# Patient Record
Sex: FEMALE | Race: WHITE | NOT HISPANIC OR LATINO | URBAN - METROPOLITAN AREA
[De-identification: names, ages, dates, MRNs, and addresses within clinical notes are randomized per-mention and may not be internally consistent; named-entity substitution may affect disease eponyms.]

---

## 2019-04-23 NOTE — PATIENT DISCUSSION
PHOTOGRAPHS: I have reviewed the external ocular photographs of this patient which show the following: lesion on the right lower eyelid.

## 2019-04-23 NOTE — PATIENT DISCUSSION
Patient presents w/ a small lesion on the right lower eyelid. Discussed r/b of excision vs continued observation with the  patient today. Patient understands and wishes to forgo excision at this time. Will follow up at her convenience for excision.

## 2019-07-10 NOTE — PATIENT DISCUSSION
03/12/2019OS+0. 50sphere+[id number]/25 +&nbsp;SN &nbsp; &nbsp; bmb
03/12/2019OS+2. [id number]&nbsp; &nbsp; &nbsp; bmb
COUNSELING:
Comments:any combo
Dry Eye Syndrome Counseling: I have discussed the diagnosis and the pathophysiology of this disease with the patient. Eyelid pathology and systemic illnesses such as Sjogren?s disease or rheumatoid arthritis may contribute to severity. Vision may be limited by dry eye, and symptoms exacerbated by environmental factors such as smoke, wind, or prolonged eye use. Lifestyle habits and environmental factors contributing to dry eyes have been reviewed with the patient. Daily prescribed and over the counter medications, along with their potential contributions to dry eye symptoms, have been discussed. Treatment options include, but are not limited to, artificial tears, punctal plugs, topical cyclosporine, oral omega-3 supplements, Lipiflow, moisture goggles, and lubricating ointments. I stressed the importance of compliance with treatment.
General:
Glasses Prescribed:
Lens Material:
MILD DRY EYES: PRESCRIBED ARTIFICIAL TEARS BID - QID, OU RETURN FOR FOLLOW-UP AS SCHEDULED OR SOONER IF SYMPTOMS WORSEN.
OCULAR ROSACEA, OU:  PRESCRIBE WARM COMPRESSES AND EYELID SCRUBS QD-BID, ARTIFICIAL TEARS BID-QID AND THE DAILY INTAKE OF OMEGA-3 FATTY ACIDS. CONSIDER TOPICAL STEROID, ORAL TETRACYCLINE AND/OR LIPIFLOW FOR EXACERBATIONS. RETURN FOR FOLLOW-UP AS SCHEDULED.
Ocular Rosacea Counseling:  I have explained the diagnosis of Ocular Rosacea and its pathophysiology. I have explained to the patient that if left untreated, ocular rosacea can cause corneal damage from tear film insufficiency or eyelid damage from inflammation, both of which could lead to vision loss. Encouraged patient to avoid exacerbating environmental factors. I instructed the patient on using warm compresses and eyelid scrubs. I also instructed the patient on using artificial tears two to four times per day. Successful management is dependent on patient compliance with the treatment regimen. Return for follow-up as scheduled or sooner if symptoms worsen.
PRESBYOPIA, OU: PRESCRIBED GLASSES AND OR CONTACTS. FOLLOW-UP AS SCHEDULED.
Presbyopia Counseling: The diagnosis of presbyopia was explained to the patient. Options for the correction of the patient's presbyopia which may include glasses, contacts or elective refractive surgery were discussed. Return for follow-up as scheduled.
Progressive - Daily wearODplanosphere+[id number]/25&nbsp;SN &nbsp; &nbsp; bmb
RLL LESION: CHARACTERISTIC CONSISTENT WITH A NEVUS:HS OF MOLE REMOVAL YEARS AGO IN THE SAME SPOT.   REFER TO [facility] FOR EVAL AND TX.
Single Vision - ReadersOD+2. [id number]&nbsp; &nbsp; &nbsp; bmb
Slab Off:No
Vertex Distance:
spherecylinderaxisaddprismvertexVAInt VANVExaminer
Irregular

## 2022-06-30 ENCOUNTER — NEW PATIENT (OUTPATIENT)
Dept: URBAN - METROPOLITAN AREA CLINIC 1 | Facility: CLINIC | Age: 62
End: 2022-06-30

## 2022-06-30 DIAGNOSIS — H52.4: ICD-10-CM

## 2022-06-30 DIAGNOSIS — Z01.00: ICD-10-CM

## 2022-06-30 PROCEDURE — 92015 DETERMINE REFRACTIVE STATE: CPT

## 2022-06-30 PROCEDURE — 92004 COMPRE OPH EXAM NEW PT 1/>: CPT

## 2022-06-30 ASSESSMENT — TONOMETRY
OD_IOP_MMHG: 14
OS_IOP_MMHG: 12

## 2022-06-30 ASSESSMENT — VISUAL ACUITY
OS_SC: 20/20
OD_SC: 20/25

## 2023-07-06 ENCOUNTER — COMPREHENSIVE EXAM (OUTPATIENT)
Dept: URBAN - METROPOLITAN AREA CLINIC 1 | Facility: CLINIC | Age: 63
End: 2023-07-06

## 2023-07-06 DIAGNOSIS — H52.221: ICD-10-CM

## 2023-07-06 DIAGNOSIS — H52.12: ICD-10-CM

## 2023-07-06 DIAGNOSIS — H52.01: ICD-10-CM

## 2023-07-06 DIAGNOSIS — H52.4: ICD-10-CM

## 2023-07-06 PROCEDURE — 92014 COMPRE OPH EXAM EST PT 1/>: CPT

## 2023-07-06 PROCEDURE — 92015 DETERMINE REFRACTIVE STATE: CPT

## 2023-07-06 ASSESSMENT — VISUAL ACUITY
OD_CC: J1
OS_CC: J1
OD_SC: 20/25
OS_SC: 20/20-1

## 2023-07-06 ASSESSMENT — TONOMETRY
OS_IOP_MMHG: 12
OD_IOP_MMHG: 12

## 2024-01-10 ENCOUNTER — COMPREHENSIVE EXAM (OUTPATIENT)
Dept: URBAN - METROPOLITAN AREA CLINIC 1 | Facility: CLINIC | Age: 64
End: 2024-01-10

## 2024-01-10 DIAGNOSIS — H16.143: ICD-10-CM

## 2024-01-10 DIAGNOSIS — H04.123: ICD-10-CM

## 2024-01-10 DIAGNOSIS — H25.813: ICD-10-CM

## 2024-01-10 DIAGNOSIS — H11.153: ICD-10-CM

## 2024-01-10 PROCEDURE — 92014 COMPRE OPH EXAM EST PT 1/>: CPT

## 2024-01-10 ASSESSMENT — TONOMETRY
OD_IOP_MMHG: 13
OS_IOP_MMHG: 13

## 2024-01-10 ASSESSMENT — VISUAL ACUITY
OS_BAT: 20/30
OD_CC: J5
OS_CC: J5
OS_SC: 20/20-2
OD_SC: 20/25+2
OD_BAT: 20/30

## 2024-03-18 ENCOUNTER — FOLLOW UP (OUTPATIENT)
Dept: URBAN - METROPOLITAN AREA CLINIC 1 | Facility: CLINIC | Age: 64
End: 2024-03-18

## 2024-03-18 DIAGNOSIS — H16.143: ICD-10-CM

## 2024-03-18 DIAGNOSIS — H10.45: ICD-10-CM

## 2024-03-18 DIAGNOSIS — H04.123: ICD-10-CM

## 2024-03-18 PROCEDURE — 92012 INTRM OPH EXAM EST PATIENT: CPT

## 2024-03-18 ASSESSMENT — VISUAL ACUITY
OD_CC: 20/20
OS_CC: 20/20

## 2024-03-18 ASSESSMENT — TONOMETRY
OS_IOP_MMHG: 13
OD_IOP_MMHG: 13

## 2025-01-13 ENCOUNTER — COMPREHENSIVE EXAM (OUTPATIENT)
Age: 65
End: 2025-01-13

## 2025-01-13 DIAGNOSIS — H25.813: ICD-10-CM

## 2025-01-13 DIAGNOSIS — H11.153: ICD-10-CM

## 2025-01-13 DIAGNOSIS — H43.811: ICD-10-CM

## 2025-01-13 DIAGNOSIS — H10.45: ICD-10-CM

## 2025-01-13 DIAGNOSIS — H11.043: ICD-10-CM

## 2025-01-13 DIAGNOSIS — H04.123: ICD-10-CM

## 2025-01-13 DIAGNOSIS — H16.143: ICD-10-CM

## 2025-01-13 PROCEDURE — 92014 COMPRE OPH EXAM EST PT 1/>: CPT
